# Patient Record
Sex: FEMALE | Race: ASIAN | NOT HISPANIC OR LATINO
[De-identification: names, ages, dates, MRNs, and addresses within clinical notes are randomized per-mention and may not be internally consistent; named-entity substitution may affect disease eponyms.]

---

## 2020-10-19 ENCOUNTER — RESULT REVIEW (OUTPATIENT)
Age: 30
End: 2020-10-19

## 2021-10-20 ENCOUNTER — RESULT REVIEW (OUTPATIENT)
Age: 31
End: 2021-10-20

## 2022-03-12 ENCOUNTER — NON-APPOINTMENT (OUTPATIENT)
Age: 32
End: 2022-03-12

## 2022-03-28 ENCOUNTER — NON-APPOINTMENT (OUTPATIENT)
Age: 32
End: 2022-03-28

## 2022-03-29 ENCOUNTER — APPOINTMENT (OUTPATIENT)
Dept: ANTEPARTUM | Facility: CLINIC | Age: 32
End: 2022-03-29
Payer: COMMERCIAL

## 2022-03-29 ENCOUNTER — ASOB RESULT (OUTPATIENT)
Age: 32
End: 2022-03-29

## 2022-03-29 PROBLEM — Z00.00 ENCOUNTER FOR PREVENTIVE HEALTH EXAMINATION: Status: ACTIVE | Noted: 2022-03-29

## 2022-03-29 PROCEDURE — 76813 OB US NUCHAL MEAS 1 GEST: CPT

## 2022-03-29 PROCEDURE — 76801 OB US < 14 WKS SINGLE FETUS: CPT

## 2022-04-21 ENCOUNTER — APPOINTMENT (OUTPATIENT)
Dept: ANTEPARTUM | Facility: CLINIC | Age: 32
End: 2022-04-21
Payer: COMMERCIAL

## 2022-04-21 ENCOUNTER — ASOB RESULT (OUTPATIENT)
Age: 32
End: 2022-04-21

## 2022-04-21 PROCEDURE — 76817 TRANSVAGINAL US OBSTETRIC: CPT

## 2022-04-21 PROCEDURE — 76805 OB US >/= 14 WKS SNGL FETUS: CPT

## 2022-04-28 ENCOUNTER — APPOINTMENT (OUTPATIENT)
Dept: ANTEPARTUM | Facility: CLINIC | Age: 32
End: 2022-04-28

## 2022-05-19 ENCOUNTER — APPOINTMENT (OUTPATIENT)
Dept: ANTEPARTUM | Facility: CLINIC | Age: 32
End: 2022-05-19
Payer: COMMERCIAL

## 2022-05-19 ENCOUNTER — ASOB RESULT (OUTPATIENT)
Age: 32
End: 2022-05-19

## 2022-05-19 PROCEDURE — 76816 OB US FOLLOW-UP PER FETUS: CPT

## 2022-07-28 ENCOUNTER — APPOINTMENT (OUTPATIENT)
Dept: ANTEPARTUM | Facility: CLINIC | Age: 32
End: 2022-07-28

## 2022-07-28 ENCOUNTER — ASOB RESULT (OUTPATIENT)
Age: 32
End: 2022-07-28

## 2022-07-28 PROCEDURE — 76816 OB US FOLLOW-UP PER FETUS: CPT

## 2022-07-28 PROCEDURE — 76819 FETAL BIOPHYS PROFIL W/O NST: CPT

## 2022-10-05 ENCOUNTER — TRANSCRIPTION ENCOUNTER (OUTPATIENT)
Age: 32
End: 2022-10-05

## 2022-10-06 ENCOUNTER — RESULT REVIEW (OUTPATIENT)
Age: 32
End: 2022-10-06

## 2022-10-06 ENCOUNTER — INPATIENT (INPATIENT)
Facility: HOSPITAL | Age: 32
LOS: 2 days | Discharge: HOME | End: 2022-10-09
Attending: OBSTETRICS & GYNECOLOGY | Admitting: OBSTETRICS & GYNECOLOGY
Payer: COMMERCIAL

## 2022-10-06 VITALS — WEIGHT: 153 LBS | HEIGHT: 63 IN

## 2022-10-06 LAB
BASOPHILS # BLD AUTO: 0.02 K/UL — SIGNIFICANT CHANGE UP (ref 0–0.2)
BASOPHILS NFR BLD AUTO: 0.2 % — SIGNIFICANT CHANGE UP (ref 0–2)
BLD GP AB SCN SERPL QL: NEGATIVE — SIGNIFICANT CHANGE UP
COVID-19 SPIKE DOMAIN AB INTERP: POSITIVE
COVID-19 SPIKE DOMAIN ANTIBODY RESULT: >250 U/ML — HIGH
EOSINOPHIL # BLD AUTO: 0.07 K/UL — SIGNIFICANT CHANGE UP (ref 0–0.5)
EOSINOPHIL NFR BLD AUTO: 0.5 % — SIGNIFICANT CHANGE UP (ref 0–6)
HCT VFR BLD CALC: 38.6 % — SIGNIFICANT CHANGE UP (ref 34.5–45)
HGB BLD-MCNC: 13.3 G/DL — SIGNIFICANT CHANGE UP (ref 11.5–15.5)
IMM GRANULOCYTES NFR BLD AUTO: 0.6 % — SIGNIFICANT CHANGE UP (ref 0–0.9)
LYMPHOCYTES # BLD AUTO: 1.89 K/UL — SIGNIFICANT CHANGE UP (ref 1–3.3)
LYMPHOCYTES # BLD AUTO: 14.4 % — SIGNIFICANT CHANGE UP (ref 13–44)
MCHC RBC-ENTMCNC: 31.7 PG — SIGNIFICANT CHANGE UP (ref 27–34)
MCHC RBC-ENTMCNC: 34.5 GM/DL — SIGNIFICANT CHANGE UP (ref 32–36)
MCV RBC AUTO: 91.9 FL — SIGNIFICANT CHANGE UP (ref 80–100)
MONOCYTES # BLD AUTO: 0.87 K/UL — SIGNIFICANT CHANGE UP (ref 0–0.9)
MONOCYTES NFR BLD AUTO: 6.6 % — SIGNIFICANT CHANGE UP (ref 2–14)
NEUTROPHILS # BLD AUTO: 10.2 K/UL — HIGH (ref 1.8–7.4)
NEUTROPHILS NFR BLD AUTO: 77.7 % — HIGH (ref 43–77)
NRBC # BLD: 0 /100 WBCS — SIGNIFICANT CHANGE UP (ref 0–0)
PLATELET # BLD AUTO: 206 K/UL — SIGNIFICANT CHANGE UP (ref 150–400)
RBC # BLD: 4.2 M/UL — SIGNIFICANT CHANGE UP (ref 3.8–5.2)
RBC # FLD: 13.5 % — SIGNIFICANT CHANGE UP (ref 10.3–14.5)
RH IG SCN BLD-IMP: POSITIVE — SIGNIFICANT CHANGE UP
RH IG SCN BLD-IMP: POSITIVE — SIGNIFICANT CHANGE UP
SARS-COV-2 IGG+IGM SERPL QL IA: >250 U/ML — HIGH
SARS-COV-2 IGG+IGM SERPL QL IA: POSITIVE
T PALLIDUM AB TITR SER: NEGATIVE — SIGNIFICANT CHANGE UP
WBC # BLD: 13.13 K/UL — HIGH (ref 3.8–10.5)
WBC # FLD AUTO: 13.13 K/UL — HIGH (ref 3.8–10.5)

## 2022-10-06 PROCEDURE — 88307 TISSUE EXAM BY PATHOLOGIST: CPT | Mod: 26

## 2022-10-06 DEVICE — SEPRAFILM 5 X 6": Type: IMPLANTABLE DEVICE | Status: FUNCTIONAL

## 2022-10-06 RX ORDER — ACETAMINOPHEN 500 MG
1000 TABLET ORAL ONCE
Refills: 0 | Status: DISCONTINUED | OUTPATIENT
Start: 2022-10-06 | End: 2022-10-09

## 2022-10-06 RX ORDER — OXYTOCIN 10 UNIT/ML
333.33 VIAL (ML) INJECTION
Qty: 20 | Refills: 0 | Status: DISCONTINUED | OUTPATIENT
Start: 2022-10-06 | End: 2022-10-09

## 2022-10-06 RX ORDER — OXYCODONE HYDROCHLORIDE 5 MG/1
5 TABLET ORAL ONCE
Refills: 0 | Status: DISCONTINUED | OUTPATIENT
Start: 2022-10-06 | End: 2022-10-09

## 2022-10-06 RX ORDER — OXYTOCIN 10 UNIT/ML
2 VIAL (ML) INJECTION
Qty: 30 | Refills: 0 | Status: DISCONTINUED | OUTPATIENT
Start: 2022-10-06 | End: 2022-10-06

## 2022-10-06 RX ORDER — CEFAZOLIN SODIUM 1 G
2000 VIAL (EA) INJECTION ONCE
Refills: 0 | Status: COMPLETED | OUTPATIENT
Start: 2022-10-06 | End: 2022-10-06

## 2022-10-06 RX ORDER — SIMETHICONE 80 MG/1
80 TABLET, CHEWABLE ORAL EVERY 4 HOURS
Refills: 0 | Status: DISCONTINUED | OUTPATIENT
Start: 2022-10-06 | End: 2022-10-09

## 2022-10-06 RX ORDER — AZITHROMYCIN 500 MG/1
500 TABLET, FILM COATED ORAL ONCE
Refills: 0 | Status: COMPLETED | OUTPATIENT
Start: 2022-10-06 | End: 2022-10-06

## 2022-10-06 RX ORDER — FENTANYL/BUPIVACAINE/NS/PF 2MCG/ML-.1
250 PLASTIC BAG, INJECTION (ML) INJECTION
Refills: 0 | Status: DISCONTINUED | OUTPATIENT
Start: 2022-10-06 | End: 2022-10-06

## 2022-10-06 RX ORDER — OXYCODONE HYDROCHLORIDE 5 MG/1
5 TABLET ORAL
Refills: 0 | Status: COMPLETED | OUTPATIENT
Start: 2022-10-06 | End: 2022-10-13

## 2022-10-06 RX ORDER — LANOLIN
1 OINTMENT (GRAM) TOPICAL EVERY 6 HOURS
Refills: 0 | Status: DISCONTINUED | OUTPATIENT
Start: 2022-10-06 | End: 2022-10-09

## 2022-10-06 RX ORDER — CHLORHEXIDINE GLUCONATE 213 G/1000ML
1 SOLUTION TOPICAL ONCE
Refills: 0 | Status: DISCONTINUED | OUTPATIENT
Start: 2022-10-06 | End: 2022-10-06

## 2022-10-06 RX ORDER — DIPHENHYDRAMINE HCL 50 MG
25 CAPSULE ORAL EVERY 6 HOURS
Refills: 0 | Status: DISCONTINUED | OUTPATIENT
Start: 2022-10-06 | End: 2022-10-09

## 2022-10-06 RX ORDER — ENOXAPARIN SODIUM 100 MG/ML
40 INJECTION SUBCUTANEOUS EVERY 24 HOURS
Refills: 0 | Status: DISCONTINUED | OUTPATIENT
Start: 2022-10-07 | End: 2022-10-09

## 2022-10-06 RX ORDER — SODIUM CHLORIDE 9 MG/ML
1000 INJECTION, SOLUTION INTRAVENOUS
Refills: 0 | Status: DISCONTINUED | OUTPATIENT
Start: 2022-10-06 | End: 2022-10-09

## 2022-10-06 RX ORDER — CITRIC ACID/SODIUM CITRATE 300-500 MG
15 SOLUTION, ORAL ORAL EVERY 6 HOURS
Refills: 0 | Status: DISCONTINUED | OUTPATIENT
Start: 2022-10-06 | End: 2022-10-06

## 2022-10-06 RX ORDER — IBUPROFEN 200 MG
600 TABLET ORAL EVERY 6 HOURS
Refills: 0 | Status: COMPLETED | OUTPATIENT
Start: 2022-10-06 | End: 2023-09-04

## 2022-10-06 RX ORDER — MAGNESIUM HYDROXIDE 400 MG/1
30 TABLET, CHEWABLE ORAL
Refills: 0 | Status: DISCONTINUED | OUTPATIENT
Start: 2022-10-06 | End: 2022-10-09

## 2022-10-06 RX ORDER — SODIUM CHLORIDE 9 MG/ML
1000 INJECTION, SOLUTION INTRAVENOUS
Refills: 0 | Status: DISCONTINUED | OUTPATIENT
Start: 2022-10-06 | End: 2022-10-06

## 2022-10-06 RX ORDER — KETOROLAC TROMETHAMINE 30 MG/ML
30 SYRINGE (ML) INJECTION EVERY 6 HOURS
Refills: 0 | Status: DISCONTINUED | OUTPATIENT
Start: 2022-10-06 | End: 2022-10-07

## 2022-10-06 RX ORDER — OXYTOCIN 10 UNIT/ML
333.33 VIAL (ML) INJECTION
Qty: 20 | Refills: 0 | Status: DISCONTINUED | OUTPATIENT
Start: 2022-10-06 | End: 2022-10-06

## 2022-10-06 RX ORDER — ACETAMINOPHEN 500 MG
975 TABLET ORAL
Refills: 0 | Status: DISCONTINUED | OUTPATIENT
Start: 2022-10-06 | End: 2022-10-09

## 2022-10-06 RX ORDER — TETANUS TOXOID, REDUCED DIPHTHERIA TOXOID AND ACELLULAR PERTUSSIS VACCINE, ADSORBED 5; 2.5; 8; 8; 2.5 [IU]/.5ML; [IU]/.5ML; UG/.5ML; UG/.5ML; UG/.5ML
0.5 SUSPENSION INTRAMUSCULAR ONCE
Refills: 0 | Status: DISCONTINUED | OUTPATIENT
Start: 2022-10-06 | End: 2022-10-09

## 2022-10-06 RX ADMIN — SODIUM CHLORIDE 125 MILLILITER(S): 9 INJECTION, SOLUTION INTRAVENOUS at 10:15

## 2022-10-06 RX ADMIN — AZITHROMYCIN 255 MILLIGRAM(S): 500 TABLET, FILM COATED ORAL at 17:23

## 2022-10-06 RX ADMIN — Medication 2 MILLIUNIT(S)/MIN: at 12:10

## 2022-10-06 RX ADMIN — Medication 100 MILLIGRAM(S): at 17:23

## 2022-10-06 RX ADMIN — SODIUM CHLORIDE 125 MILLILITER(S): 9 INJECTION, SOLUTION INTRAVENOUS at 10:45

## 2022-10-06 RX ADMIN — Medication 250 MILLILITER(S): at 11:10

## 2022-10-06 RX ADMIN — Medication 30 MILLIGRAM(S): at 20:28

## 2022-10-06 RX ADMIN — Medication 30 MILLIGRAM(S): at 21:30

## 2022-10-06 RX ADMIN — Medication 975 MILLIGRAM(S): at 22:00

## 2022-10-06 RX ADMIN — SODIUM CHLORIDE 125 MILLILITER(S): 9 INJECTION, SOLUTION INTRAVENOUS at 11:30

## 2022-10-06 RX ADMIN — Medication 975 MILLIGRAM(S): at 23:00

## 2022-10-06 NOTE — PATIENT PROFILE OB - FALL HARM RISK - UNIVERSAL INTERVENTIONS
Bed in lowest position, wheels locked, appropriate side rails in place/Call bell, personal items and telephone in reach/Instruct patient to call for assistance before getting out of bed or chair/Non-slip footwear when patient is out of bed/[place] to call system/Physically safe environment - no spills, clutter or unnecessary equipment/Purposeful Proactive Rounding/Room/bathroom lighting operational, light cord in reach

## 2022-10-06 NOTE — PRE-ANESTHESIA EVALUATION ADULT - NSANTHAIRWAYFT_ENT_ALL_CORE
Inter-incisor distance: > 3cm  HMD: > 6cm  Submandibular space: normal compliance  Neck:  normal length and thickness  Head/Neck:  FROM

## 2022-10-07 ENCOUNTER — TRANSCRIPTION ENCOUNTER (OUTPATIENT)
Age: 32
End: 2022-10-07

## 2022-10-07 LAB
BASOPHILS # BLD AUTO: 0.02 K/UL — SIGNIFICANT CHANGE UP (ref 0–0.2)
BASOPHILS NFR BLD AUTO: 0.1 % — SIGNIFICANT CHANGE UP (ref 0–2)
EOSINOPHIL # BLD AUTO: 0.01 K/UL — SIGNIFICANT CHANGE UP (ref 0–0.5)
EOSINOPHIL NFR BLD AUTO: 0.1 % — SIGNIFICANT CHANGE UP (ref 0–6)
HCT VFR BLD CALC: 31.2 % — LOW (ref 34.5–45)
HGB BLD-MCNC: 10.4 G/DL — LOW (ref 11.5–15.5)
IMM GRANULOCYTES NFR BLD AUTO: 0.4 % — SIGNIFICANT CHANGE UP (ref 0–0.9)
LYMPHOCYTES # BLD AUTO: 1.48 K/UL — SIGNIFICANT CHANGE UP (ref 1–3.3)
LYMPHOCYTES # BLD AUTO: 9.3 % — LOW (ref 13–44)
MCHC RBC-ENTMCNC: 31.7 PG — SIGNIFICANT CHANGE UP (ref 27–34)
MCHC RBC-ENTMCNC: 33.3 GM/DL — SIGNIFICANT CHANGE UP (ref 32–36)
MCV RBC AUTO: 95.1 FL — SIGNIFICANT CHANGE UP (ref 80–100)
MONOCYTES # BLD AUTO: 1.15 K/UL — HIGH (ref 0–0.9)
MONOCYTES NFR BLD AUTO: 7.2 % — SIGNIFICANT CHANGE UP (ref 2–14)
NEUTROPHILS # BLD AUTO: 13.17 K/UL — HIGH (ref 1.8–7.4)
NEUTROPHILS NFR BLD AUTO: 82.9 % — HIGH (ref 43–77)
NRBC # BLD: 0 /100 WBCS — SIGNIFICANT CHANGE UP (ref 0–0)
PLATELET # BLD AUTO: 172 K/UL — SIGNIFICANT CHANGE UP (ref 150–400)
RBC # BLD: 3.28 M/UL — LOW (ref 3.8–5.2)
RBC # FLD: 13.6 % — SIGNIFICANT CHANGE UP (ref 10.3–14.5)
WBC # BLD: 15.9 K/UL — HIGH (ref 3.8–10.5)
WBC # FLD AUTO: 15.9 K/UL — HIGH (ref 3.8–10.5)

## 2022-10-07 RX ORDER — OXYCODONE HYDROCHLORIDE 5 MG/1
5 TABLET ORAL
Refills: 0 | Status: DISCONTINUED | OUTPATIENT
Start: 2022-10-07 | End: 2022-10-09

## 2022-10-07 RX ORDER — ACETAMINOPHEN 500 MG
3 TABLET ORAL
Qty: 0 | Refills: 0 | DISCHARGE
Start: 2022-10-07

## 2022-10-07 RX ORDER — IBUPROFEN 200 MG
1 TABLET ORAL
Qty: 0 | Refills: 0 | DISCHARGE
Start: 2022-10-07

## 2022-10-07 RX ORDER — IBUPROFEN 200 MG
600 TABLET ORAL EVERY 6 HOURS
Refills: 0 | Status: DISCONTINUED | OUTPATIENT
Start: 2022-10-07 | End: 2022-10-09

## 2022-10-07 RX ADMIN — Medication 600 MILLIGRAM(S): at 17:10

## 2022-10-07 RX ADMIN — SIMETHICONE 80 MILLIGRAM(S): 80 TABLET, CHEWABLE ORAL at 14:45

## 2022-10-07 RX ADMIN — Medication 30 MILLIGRAM(S): at 13:10

## 2022-10-07 RX ADMIN — Medication 30 MILLIGRAM(S): at 12:35

## 2022-10-07 RX ADMIN — SIMETHICONE 80 MILLIGRAM(S): 80 TABLET, CHEWABLE ORAL at 23:35

## 2022-10-07 RX ADMIN — Medication 30 MILLIGRAM(S): at 08:23

## 2022-10-07 RX ADMIN — Medication 975 MILLIGRAM(S): at 14:45

## 2022-10-07 RX ADMIN — Medication 0.5 MILLILITER(S): at 12:36

## 2022-10-07 RX ADMIN — SIMETHICONE 80 MILLIGRAM(S): 80 TABLET, CHEWABLE ORAL at 18:57

## 2022-10-07 RX ADMIN — Medication 975 MILLIGRAM(S): at 15:38

## 2022-10-07 RX ADMIN — Medication 30 MILLIGRAM(S): at 07:41

## 2022-10-07 RX ADMIN — Medication 975 MILLIGRAM(S): at 03:40

## 2022-10-07 RX ADMIN — Medication 600 MILLIGRAM(S): at 23:35

## 2022-10-07 RX ADMIN — Medication 30 MILLIGRAM(S): at 02:47

## 2022-10-07 RX ADMIN — ENOXAPARIN SODIUM 40 MILLIGRAM(S): 100 INJECTION SUBCUTANEOUS at 07:41

## 2022-10-07 RX ADMIN — Medication 600 MILLIGRAM(S): at 18:10

## 2022-10-07 RX ADMIN — OXYCODONE HYDROCHLORIDE 5 MILLIGRAM(S): 5 TABLET ORAL at 19:53

## 2022-10-07 RX ADMIN — Medication 30 MILLIGRAM(S): at 03:47

## 2022-10-07 RX ADMIN — Medication 975 MILLIGRAM(S): at 21:15

## 2022-10-07 RX ADMIN — Medication 975 MILLIGRAM(S): at 02:47

## 2022-10-07 RX ADMIN — Medication 975 MILLIGRAM(S): at 20:36

## 2022-10-07 NOTE — PROVIDER CONTACT NOTE (OTHER) - ASSESSMENT
Fundus firm at U  Lochia Rubra Small  Denies c/o's dizziness, sob, palpitations or feeling "lightheaded".

## 2022-10-07 NOTE — LACTATION INITIAL EVALUATION - LACTATION INTERVENTIONS
initiate/review safe skin-to-skin/initiate/review hand expression/initiate/review techniques for position and latch/initiate/review breast massage/compression/reviewed components of an effective feeding and at least 8 effective feedings per day required/reviewed importance of monitoring infant diapers, the breastfeeding log, and minimum output each day/reviewed strategies to transition to breastfeeding only/reviewed benefits and recommendations for rooming in/reviewed feeding on demand/by cue at least 8 times a day

## 2022-10-07 NOTE — DISCHARGE NOTE OB - HOSPITAL COURSE
Admitted in early labor.  Delivered via primary  section due to NRFHT.  Uncomplicated surgery and postpartum course.  Acute blood loss anemia noted on post-operative CBC.  Patient stable with normal vital signs.  No intervention necessary.

## 2022-10-07 NOTE — LACTATION INITIAL EVALUATION - NS LACT CON REASON FOR REQ
40 wk baby seen around 17 hrs of life. Baby placed skin to skin on mothers chest. Complete breastfeeding education was provided, manual expression technique taught, colostrum easily expressible bilaterally.  Baby is eager to root towards the breast. Positioning and latch strategies were taught. In cross cradle and football holds, baby deeply latched and developed an organized, rhythmic suck with interittent swallows noted. Baby continues to feed for 15+ min and mother confirms a strong pull of the nipple and denies discomfort.  Responsive/ frequent feeds, continued/ increased SSC and rooming-in were encouraged. Expectations surrounding intake and output were discussed. All questions were answered, mother verbalized understanding of teaching and info given. It is in their feeding plan to supplement with bottles of formula and expressed EBM once home, so the supplementation guideline was shared. Referred for telelactation./primaparous mom

## 2022-10-07 NOTE — DISCHARGE NOTE OB - CARE PROVIDER_API CALL
Zohra Zapata  OBSTETRICS AND GYNECOLOGY  [address] [address]  Phone: [phone]  Fax: [phone]  Follow Up Time: 1 week

## 2022-10-07 NOTE — DISCHARGE NOTE OB - PATIENT PORTAL LINK FT
You can access the FollowMyHealth Patient Portal offered by [facility] by registering at the following website: [email or web address]. By joining [employer]’s FollowMyHealth portal, you will also be able to view your health information using other applications (apps) compatible with our system.

## 2022-10-07 NOTE — DISCHARGE NOTE OB - NS MD DC FALL RISK RISK
For information on Fall & Injury Prevention, visit: [email or web address] OR  [email or web address] OR  https://www.cdc.gov/steadi/patient.html

## 2022-10-08 RX ADMIN — Medication 975 MILLIGRAM(S): at 22:30

## 2022-10-08 RX ADMIN — Medication 600 MILLIGRAM(S): at 18:14

## 2022-10-08 RX ADMIN — Medication 600 MILLIGRAM(S): at 23:56

## 2022-10-08 RX ADMIN — Medication 975 MILLIGRAM(S): at 21:40

## 2022-10-08 RX ADMIN — ENOXAPARIN SODIUM 40 MILLIGRAM(S): 100 INJECTION SUBCUTANEOUS at 07:06

## 2022-10-08 RX ADMIN — Medication 975 MILLIGRAM(S): at 10:15

## 2022-10-08 RX ADMIN — Medication 600 MILLIGRAM(S): at 05:08

## 2022-10-08 RX ADMIN — SIMETHICONE 80 MILLIGRAM(S): 80 TABLET, CHEWABLE ORAL at 21:40

## 2022-10-08 RX ADMIN — Medication 600 MILLIGRAM(S): at 06:00

## 2022-10-08 RX ADMIN — Medication 600 MILLIGRAM(S): at 19:14

## 2022-10-08 RX ADMIN — SIMETHICONE 80 MILLIGRAM(S): 80 TABLET, CHEWABLE ORAL at 05:07

## 2022-10-08 RX ADMIN — Medication 600 MILLIGRAM(S): at 00:15

## 2022-10-08 RX ADMIN — Medication 975 MILLIGRAM(S): at 09:26

## 2022-10-08 RX ADMIN — Medication 600 MILLIGRAM(S): at 11:40

## 2022-10-08 RX ADMIN — Medication 975 MILLIGRAM(S): at 15:26

## 2022-10-08 RX ADMIN — Medication 600 MILLIGRAM(S): at 12:40

## 2022-10-08 RX ADMIN — MAGNESIUM HYDROXIDE 30 MILLILITER(S): 400 TABLET, CHEWABLE ORAL at 09:26

## 2022-10-08 RX ADMIN — SIMETHICONE 80 MILLIGRAM(S): 80 TABLET, CHEWABLE ORAL at 09:27

## 2022-10-08 RX ADMIN — SIMETHICONE 80 MILLIGRAM(S): 80 TABLET, CHEWABLE ORAL at 15:26

## 2022-10-08 RX ADMIN — Medication 975 MILLIGRAM(S): at 16:25

## 2022-10-09 VITALS
DIASTOLIC BLOOD PRESSURE: 64 MMHG | TEMPERATURE: 99 F | HEART RATE: 71 BPM | OXYGEN SATURATION: 97 % | RESPIRATION RATE: 18 BRPM | SYSTOLIC BLOOD PRESSURE: 99 MMHG

## 2022-10-09 PROCEDURE — 90707 MMR VACCINE SC: CPT

## 2022-10-09 PROCEDURE — 86850 RBC ANTIBODY SCREEN: CPT

## 2022-10-09 PROCEDURE — 86901 BLOOD TYPING SEROLOGIC RH(D): CPT

## 2022-10-09 PROCEDURE — 88307 TISSUE EXAM BY PATHOLOGIST: CPT

## 2022-10-09 PROCEDURE — 59050 FETAL MONITOR W/REPORT: CPT

## 2022-10-09 PROCEDURE — 85025 COMPLETE CBC W/AUTO DIFF WBC: CPT

## 2022-10-09 PROCEDURE — 86900 BLOOD TYPING SEROLOGIC ABO: CPT

## 2022-10-09 PROCEDURE — C1765: CPT

## 2022-10-09 PROCEDURE — 86780 TREPONEMA PALLIDUM: CPT

## 2022-10-09 PROCEDURE — 86769 SARS-COV-2 COVID-19 ANTIBODY: CPT

## 2022-10-09 PROCEDURE — 36415 COLL VENOUS BLD VENIPUNCTURE: CPT

## 2022-10-09 RX ADMIN — SIMETHICONE 80 MILLIGRAM(S): 80 TABLET, CHEWABLE ORAL at 06:19

## 2022-10-09 RX ADMIN — Medication 975 MILLIGRAM(S): at 09:10

## 2022-10-09 RX ADMIN — Medication 600 MILLIGRAM(S): at 00:40

## 2022-10-09 RX ADMIN — Medication 600 MILLIGRAM(S): at 11:37

## 2022-10-09 RX ADMIN — Medication 600 MILLIGRAM(S): at 12:30

## 2022-10-09 RX ADMIN — Medication 975 MILLIGRAM(S): at 14:03

## 2022-10-09 RX ADMIN — SIMETHICONE 80 MILLIGRAM(S): 80 TABLET, CHEWABLE ORAL at 11:36

## 2022-10-09 RX ADMIN — Medication 975 MILLIGRAM(S): at 14:50

## 2022-10-09 RX ADMIN — Medication 975 MILLIGRAM(S): at 08:16

## 2022-10-09 RX ADMIN — Medication 600 MILLIGRAM(S): at 06:18

## 2022-10-09 RX ADMIN — ENOXAPARIN SODIUM 40 MILLIGRAM(S): 100 INJECTION SUBCUTANEOUS at 06:18

## 2022-10-09 NOTE — PROGRESS NOTE ADULT - ASSESSMENT
A/P: 31y s/p  section, POD#2, stable  -  Pain: PO motrin q6hrs, tylenol q8hrs, oxycodone for severe pain PRN  -  Post-operatively labs: post-op Hgb , hemodynamically stable, no symptoms of anemia   -  GI: tolerating regular diet, passing gas,  -  : s/p graff , urinating without difficulty  -  DVT prophylaxis: encouraged increased ambulation, SCDs, SQL  -  Dispo: POD 3 or 4
A/P: 31y POD1 s/p primary  section for arrest of dilation and NRFHT. Pt is hemodynamically stable.   -  Neuro-Pain control with motrin/acetaminophen, oxycodone for severe pain PRN  -  Cardio: no issues. Continue to monitor routinely   -  GI: Clear diet, Reglan/Zofran prn   -  : will remove graff this AM, f/u TOV  -  DVT prophylaxis: Lovenox 40mg qd, SCDs  -  Activity- ambulate as tolerated   -  Heme: post-op hemoglobin stable 
A/P: 31y s/p  section, POD#3, stable  -  Pain: PO motrin q6hrs, tylenol q8hrs, oxycodone for severe pain PRN  -  Post-operatively labs: Hemodynamically stable, no symptoms of anemia   -  GI: tolerating regular diet, passing gas  -  : s/p graff , urinating without difficulty  -  DVT prophylaxis: encouraged increased ambulation, SCDs, Lovenox  -  Dispo: Today, unless mentioned otherwise.

## 2022-10-09 NOTE — PROGRESS NOTE ADULT - SUBJECTIVE AND OBJECTIVE BOX
Patient evaluated at bedside this morning, resting comfortable in bed.   She reports pain is well controlled with  She denies headache, dizziness, chest pain, palpitations, shortness of breathe, nausea, vomiting or heavy vaginal bleeding.  She has been ambulating without assistance, voiding spontaneously, passing gas, tolerating regular diet    Physical Exam:  Vital Signs Last 24 Hrs  T(C): 36.7 (08 Oct 2022 06:15), Max: 37 (07 Oct 2022 14:00)  T(F): 98.1 (08 Oct 2022 06:15), Max: 98.6 (07 Oct 2022 14:00)  HR: 68 (08 Oct 2022 06:15) (64 - 82)  BP: 94/57 (08 Oct 2022 06:15) (90/56 - 94/65)  BP(mean): --  RR: 18 (08 Oct 2022 06:15) (18 - 18)  SpO2: 96% (08 Oct 2022 06:15) (95% - 98%)    Parameters below as of 08 Oct 2022 06:15  Patient On (Oxygen Delivery Method): room air        GA: NAD, A+0 x 3  Pulm: comfotable on RA  Abd: + BS, soft, nontender, nondistended, no rebound or guarding, incision clean, dry and intact, uterus firm at midline, 2 fb below umbilicus  : lochia WNL  Extremities: no swelling or calf tenderness                             10.4   15.90 )-----------( 172      ( 07 Oct 2022 07:28 )             31.2               
Patient evaluated at bedside this morning, resting comfortable in bed.   She reports pain is well controlled.  She denies headache, dizziness, chest pain, palpitations, shortness of breathe, nausea, vomiting or heavy vaginal bleeding.  She has been ambulating without assistance, voiding spontaneously, passing gas, tolerating regular diet and is breastfeeding.    Physical Exam:  Vital Signs Last 24 Hrs  T(C): 37.1 (08 Oct 2022 18:00), Max: 37.1 (08 Oct 2022 18:00)  T(F): 98.7 (08 Oct 2022 18:00), Max: 98.7 (08 Oct 2022 18:00)  HR: 72 (08 Oct 2022 18:00) (72 - 72)  BP: 94/60 (08 Oct 2022 18:00) (94/60 - 94/60)  BP(mean): --  RR: 18 (08 Oct 2022 18:00) (18 - 18)  SpO2: 97% (08 Oct 2022 18:00) (97% - 97%)    Parameters below as of 08 Oct 2022 18:00  Patient On (Oxygen Delivery Method): room air        GA: NAD, A+0 x 3  Abd: + BS, soft, nontender, nondistended, no rebound or guarding, incision clean, dry and intact, uterus firm at midline and below umbilicus  : lochia WNL  Extremities: no swelling or calf tenderness                             10.4   15.90 )-----------( 172      ( 07 Oct 2022 07:28 )             31.2               
Patient seen and evaluated at bedside. Pt states she has minimal abdominal pain, overall controlled with pain medication. She is tolerating clears. Not yet OOB, passing flatus, or voiding. Graff in place  She denies headache, dizziness, chest pain, palpitations, shortness of breath, nausea, vomiting, or heavy vaginal bleeding.    Physical Exam:  Vital Signs Last 24 Hrs  T(C): 36.7 (07 Oct 2022 05:45), Max: 37.2 (06 Oct 2022 21:51)  T(F): 98.1 (07 Oct 2022 05:45), Max: 98.9 (06 Oct 2022 21:51)  HR: 67 (07 Oct 2022 05:40) (67 - 100)  BP: 84/47 (07 Oct 2022 05:40) (84/47 - 101/56)  BP(mean): 74 (06 Oct 2022 21:10) (62 - 74)  RR: 18 (07 Oct 2022 05:40) (16 - 18)  SpO2: 96% (07 Oct 2022 02:20) (95% - 99%)    Parameters below as of 06 Oct 2022 19:50  Patient On (Oxygen Delivery Method): room air        GA: comfortable in NAD  Abd: soft, nontender, nondistended, no rebound or guarding. Abdominal dressing removed, incision clean, dry and intact w/ steri strips in place, uterus firm at midline  : graff in situ draining light yellow urine, lochia WNL  Extremities: no swelling or calf tenderness, SCDs in place                            10.4   15.90 )-----------( 172      ( 07 Oct 2022 07:28 )             31.2               acetaminophen     Tablet .. 975 milliGRAM(s) Oral <User Schedule>  acetaminophen   IVPB .. 1000 milliGRAM(s) IV Intermittent once  diphenhydrAMINE 25 milliGRAM(s) Oral every 6 hours PRN  diphtheria/tetanus/pertussis (acellular) Vaccine (ADAcel) 0.5 milliLiter(s) IntraMuscular once  enoxaparin Injectable 40 milliGRAM(s) SubCutaneous every 24 hours  ibuprofen  Tablet. 600 milliGRAM(s) Oral every 6 hours  ketorolac   Injectable 30 milliGRAM(s) IV Push every 6 hours  lactated ringers. 1000 milliLiter(s) IV Continuous <Continuous>  lanolin Ointment 1 Application(s) Topical every 6 hours PRN  magnesium hydroxide Suspension 30 milliLiter(s) Oral two times a day PRN  oxyCODONE    IR 5 milliGRAM(s) Oral every 3 hours PRN  oxyCODONE    IR 5 milliGRAM(s) Oral once PRN  oxytocin Infusion 333.333 milliUNIT(s)/Min IV Continuous <Continuous>  simethicone 80 milliGRAM(s) Chew every 4 hours PRN

## 2022-10-13 DIAGNOSIS — D62 ACUTE POSTHEMORRHAGIC ANEMIA: ICD-10-CM

## 2022-10-13 DIAGNOSIS — Z28.09 IMMUNIZATION NOT CARRIED OUT BECAUSE OF OTHER CONTRAINDICATION: ICD-10-CM

## 2022-10-13 DIAGNOSIS — Z3A.40 40 WEEKS GESTATION OF PREGNANCY: ICD-10-CM

## 2022-10-18 LAB — SURGICAL PATHOLOGY STUDY: SIGNIFICANT CHANGE UP

## 2024-05-10 NOTE — PATIENT PROFILE OB - IN THE PAST 12 MONTHS HAVE YOU USED DRUGS OTHER THAN THOSE REQUIRED FOR MEDICAL REASON?
Return to SCHOOL    May 10, 2024      Re:   Marie Robles  [address]  [facility] [address]           This is to certify that Marie Robles was seen in Urgent Care and can return to SCHOOL on 5/13/2024.  Please excuse for school missed 5/10/24.        SIGNATURE: ___________________________________________,   5/10/2024  MD Marla Oates MD  [facility]  [address]  [place] [address]  [phone]    
No

## 2024-05-21 NOTE — PRE-ANESTHESIA EVALUATION ADULT - NSANTHDISPORD_GEN_ALL_CORE
Floor What Type Of Note Output Would You Prefer (Optional)?: Bullet Format How Severe Is Your Skin Lesion?: moderate Has Your Skin Lesion Been Treated?: not been treated Is This A New Presentation, Or A Follow-Up?: Skin Lesions